# Patient Record
Sex: FEMALE | Race: WHITE | NOT HISPANIC OR LATINO | URBAN - METROPOLITAN AREA
[De-identification: names, ages, dates, MRNs, and addresses within clinical notes are randomized per-mention and may not be internally consistent; named-entity substitution may affect disease eponyms.]

---

## 2023-06-06 ENCOUNTER — OFFICE VISIT (OUTPATIENT)
Dept: PRIMARY CARE | Facility: CLINIC | Age: 53
End: 2023-06-06
Payer: COMMERCIAL

## 2023-06-06 VITALS
DIASTOLIC BLOOD PRESSURE: 86 MMHG | HEART RATE: 73 BPM | BODY MASS INDEX: 23.96 KG/M2 | WEIGHT: 143.8 LBS | HEIGHT: 65 IN | OXYGEN SATURATION: 98 % | SYSTOLIC BLOOD PRESSURE: 144 MMHG

## 2023-06-06 DIAGNOSIS — I21.4 NSTEMI (NON-ST ELEVATED MYOCARDIAL INFARCTION) (MULTI): ICD-10-CM

## 2023-06-06 DIAGNOSIS — I10 PRIMARY HYPERTENSION: Primary | ICD-10-CM

## 2023-06-06 PROBLEM — Z71.89 CARDIAC RISK COUNSELING: Status: ACTIVE | Noted: 2023-06-06

## 2023-06-06 PROBLEM — Z71.89 CARDIAC RISK COUNSELING: Status: RESOLVED | Noted: 2023-06-06 | Resolved: 2023-06-06

## 2023-06-06 PROCEDURE — 1036F TOBACCO NON-USER: CPT | Performed by: FAMILY MEDICINE

## 2023-06-06 PROCEDURE — 3077F SYST BP >= 140 MM HG: CPT | Performed by: FAMILY MEDICINE

## 2023-06-06 PROCEDURE — 99204 OFFICE O/P NEW MOD 45 MIN: CPT | Performed by: FAMILY MEDICINE

## 2023-06-06 PROCEDURE — 3079F DIAST BP 80-89 MM HG: CPT | Performed by: FAMILY MEDICINE

## 2023-06-06 RX ORDER — CARVEDILOL 6.25 MG/1
6.25 TABLET ORAL
COMMUNITY
Start: 2023-06-01 | End: 2023-10-24 | Stop reason: ALTCHOICE

## 2023-06-06 RX ORDER — NAPROXEN SODIUM 220 MG/1
81 TABLET, FILM COATED ORAL DAILY
COMMUNITY
Start: 2023-05-24

## 2023-06-06 RX ORDER — ATORVASTATIN CALCIUM 40 MG/1
1 TABLET, FILM COATED ORAL NIGHTLY
COMMUNITY
Start: 2023-06-05

## 2023-06-06 RX ORDER — LISINOPRIL 2.5 MG/1
2.5 TABLET ORAL DAILY
Qty: 30 TABLET | Refills: 5 | Status: SHIPPED | OUTPATIENT
Start: 2023-06-06 | End: 2023-10-24 | Stop reason: ALTCHOICE

## 2023-06-06 RX ORDER — CLOPIDOGREL BISULFATE 75 MG/1
75 TABLET ORAL
COMMUNITY
Start: 2023-06-05 | End: 2023-10-24 | Stop reason: ALTCHOICE

## 2023-06-06 RX ORDER — LORAZEPAM 0.5 MG/1
0.5 TABLET ORAL 2 TIMES DAILY
COMMUNITY
End: 2024-05-23 | Stop reason: WASHOUT

## 2023-06-06 ASSESSMENT — ENCOUNTER SYMPTOMS
ACTIVITY CHANGE: 0
HEADACHES: 0
PALPITATIONS: 0
APPETITE CHANGE: 0
BACK PAIN: 0
FACIAL ASYMMETRY: 0
COLOR CHANGE: 0
DIZZINESS: 0
LIGHT-HEADEDNESS: 0
FATIGUE: 0
COUGH: 0
ARTHRALGIAS: 0
CHEST TIGHTNESS: 0
CHOKING: 0
FEVER: 0

## 2023-06-06 NOTE — PROGRESS NOTES
"Subjective   Patient ID: Jerri Duque is a 52 y.o. female who presents for New patient to [facility]. .    HPI   Patient presents to [facility]. She did just have a heart attack. She is concerned about her blood pressure.     Fam Hx  Maternal DMII  Mom () living,   Dad () living,   Paternal CAD  Brother (40) living,     Exercise walks  ETOH denies  Caffeine denies  Tobacco denies    OB/GYN Dr. Chitra Little  Cardiologist, Dr. Rivera (NP)    Mammogram due 2023  DEXA @ 67  Colonoscopy ?    Past Med Hx  MI (2023)  HTN  HPL    Past Surg Hx  C section  Bunionectomy    Patient denies other complaints.  Review of Systems   Constitutional:  Negative for activity change, appetite change, fatigue and fever.   HENT:  Negative for congestion.    Respiratory:  Negative for cough, choking and chest tightness.    Cardiovascular:  Negative for chest pain, palpitations and leg swelling.   Musculoskeletal:  Negative for arthralgias, back pain and gait problem.   Skin:  Negative for color change and pallor.   Neurological:  Negative for dizziness, facial asymmetry, light-headedness and headaches.       Objective   /86 (BP Location: Left arm)   Pulse 73   Ht 1.645 m (5' 4.75\")   Wt 65.2 kg (143 lb 12.8 oz)   SpO2 98%   BMI 24.11 kg/m²   BSA Body surface area is 1.73 meters squared.      Physical Exam  Constitutional:       General: She is not in acute distress.     Appearance: Normal appearance. She is not toxic-appearing.   HENT:      Head: Normocephalic.      Right Ear: Tympanic membrane, ear canal and external ear normal.      Left Ear: Tympanic membrane, ear canal and external ear normal.      Nose: Nose normal.      Mouth/Throat:      Pharynx: Oropharynx is clear.   Eyes:      Conjunctiva/sclera: Conjunctivae normal.      Pupils: Pupils are equal, round, and reactive to light.   Cardiovascular:      Rate and Rhythm: Normal rate and regular rhythm.      Pulses: Normal pulses.      Heart sounds: Normal heart sounds. "   Pulmonary:      Effort: No respiratory distress.      Breath sounds: No wheezing, rhonchi or rales.   Abdominal:      General: Bowel sounds are normal. There is no distension.      Palpations: Abdomen is soft.      Tenderness: There is no abdominal tenderness.   Musculoskeletal:         General: No swelling or tenderness.      Cervical back: No tenderness.   Skin:     Findings: No lesion or rash.   Neurological:      General: No focal deficit present.      Mental Status: She is alert and oriented to person, place, and time. Mental status is at baseline.      Gait: Gait normal.   Psychiatric:         Mood and Affect: Mood normal.         Behavior: Behavior normal.         Thought Content: Thought content normal.         Judgment: Judgment normal.       No results found for any previous visit.     Current Outpatient Medications on File Prior to Visit   Medication Sig Dispense Refill    aspirin 81 mg chewable tablet Chew 1 tablet (81 mg) once daily.      atorvastatin (Lipitor) 40 mg tablet Take 1 tablet (40 mg) by mouth once daily at bedtime.      carvedilol (Coreg) 6.25 mg tablet Take 1 tablet (6.25 mg) by mouth 2 times a day with meals.      clopidogrel (Plavix) 75 mg tablet Take 1 tablet (75 mg) by mouth once daily.      LORazepam (Ativan) 0.5 mg tablet Take 1 tablet (0.5 mg) by mouth 2 times a day.       No current facility-administered medications on file prior to visit.     No images are attached to the encounter.           Assessment/Plan   Diagnoses and all orders for this visit:  Primary hypertension  NSTEMI (non-ST elevated myocardial infarction) (CMS/[facility])    Patient to continue on her current medication, she will consider lisinopril daily  Patient to see cardiology at   Patient to call if questions or concerns

## 2023-06-08 ENCOUNTER — TELEPHONE (OUTPATIENT)
Dept: PRIMARY CARE | Facility: CLINIC | Age: 53
End: 2023-06-08
Payer: COMMERCIAL

## 2023-06-08 NOTE — TELEPHONE ENCOUNTER
Pt left a message saying that she had a heart attack 2.5 wks ago. She was in to see you on 6-6-23 & you put her on Lisinopril 2.5 mg. She has had a headache last night & again this am. She didn't know if she has to continue taking it or if it could be causing her headache?   I called pt to get some BP readings - BP on Mon was 130/84, Tues 130/83, 144/86, 121/80 - so she said that it is coming down. But she didn't have a headache before she started the Lisinopril. She has not taken it today because she has been working & hasn't had a chance to. She also has a call out to her cardiologist regarding this, but she said you prescribed it so she wants to know what you think?

## 2023-10-04 ENCOUNTER — TELEPHONE (OUTPATIENT)
Dept: CARDIOLOGY | Facility: HOSPITAL | Age: 53
End: 2023-10-04
Payer: COMMERCIAL

## 2023-10-04 NOTE — TELEPHONE ENCOUNTER
Received T[facility] patient stating that her PCP wanted her to double her atorvastatin to 80mg daily based on lipid panel with LDL of 84 and history of MI. Patient questioning whether Dr. Watkins would agree. Lab results reviewed by Dr. Watkins who recommends that patient remain on atorvastatin 40mg once daily with LDL goal of less than 100. Patient aware of same and in agreement with treatment plan.

## 2023-10-22 PROBLEM — R92.30 DENSE BREAST TISSUE ON MAMMOGRAM: Status: ACTIVE | Noted: 2020-09-30

## 2023-10-22 PROBLEM — E55.9 VITAMIN D DEFICIENCY: Status: ACTIVE | Noted: 2017-03-01

## 2023-10-22 PROBLEM — D51.9 ANEMIA DUE TO VITAMIN B12 DEFICIENCY: Status: ACTIVE | Noted: 2017-03-07

## 2023-10-22 PROBLEM — I25.42 SPONTANEOUS DISSECTION OF CORONARY ARTERY: Status: ACTIVE | Noted: 2023-10-22

## 2023-10-22 PROBLEM — N90.89 VULVAR LESION: Status: ACTIVE | Noted: 2021-02-10

## 2023-10-22 PROBLEM — E78.5 HYPERLIPIDEMIA: Status: ACTIVE | Noted: 2023-05-24

## 2023-10-22 PROBLEM — I21.4 NSTEMI (NON-ST ELEVATED MYOCARDIAL INFARCTION) (MULTI): Status: ACTIVE | Noted: 2023-05-22

## 2023-10-22 PROBLEM — I10 ESSENTIAL HYPERTENSION: Status: ACTIVE | Noted: 2017-03-01

## 2023-10-22 RX ORDER — CARVEDILOL 3.12 MG/1
3.12 TABLET ORAL 2 TIMES DAILY
COMMUNITY
Start: 2023-08-21 | End: 2023-10-24 | Stop reason: ALTCHOICE

## 2023-10-22 RX ORDER — AMLODIPINE BESYLATE 2.5 MG/1
2.5 TABLET ORAL DAILY
COMMUNITY
Start: 2023-06-08 | End: 2023-10-24 | Stop reason: ALTCHOICE

## 2023-10-22 RX ORDER — ATORVASTATIN CALCIUM 80 MG/1
80 TABLET, FILM COATED ORAL
COMMUNITY
Start: 2023-10-03 | End: 2023-10-24 | Stop reason: ALTCHOICE

## 2023-10-24 ENCOUNTER — OFFICE VISIT (OUTPATIENT)
Dept: CARDIOLOGY | Facility: HOSPITAL | Age: 53
End: 2023-10-24
Payer: COMMERCIAL

## 2023-10-24 ENCOUNTER — HOSPITAL ENCOUNTER (OUTPATIENT)
Dept: CARDIOLOGY | Facility: HOSPITAL | Age: 53
Discharge: HOME | End: 2023-10-24
Payer: COMMERCIAL

## 2023-10-24 VITALS
SYSTOLIC BLOOD PRESSURE: 128 MMHG | DIASTOLIC BLOOD PRESSURE: 72 MMHG | BODY MASS INDEX: 24.03 KG/M2 | HEART RATE: 55 BPM | HEIGHT: 66 IN | OXYGEN SATURATION: 100 % | WEIGHT: 149.5 LBS

## 2023-10-24 DIAGNOSIS — I25.42 SPONTANEOUS DISSECTION OF CORONARY ARTERY: ICD-10-CM

## 2023-10-24 DIAGNOSIS — I25.2 OLD MYOCARDIAL INFARCTION: ICD-10-CM

## 2023-10-24 DIAGNOSIS — I25.42 CORONARY ARTERY DISSECTION: ICD-10-CM

## 2023-10-24 DIAGNOSIS — Z01.818 ENCOUNTER FOR OTHER PREPROCEDURAL EXAMINATION: ICD-10-CM

## 2023-10-24 LAB — EJECTION FRACTION APICAL 4 CHAMBER: 51.9

## 2023-10-24 PROCEDURE — 3078F DIAST BP <80 MM HG: CPT | Performed by: INTERNAL MEDICINE

## 2023-10-24 PROCEDURE — 93306 TTE W/DOPPLER COMPLETE: CPT

## 2023-10-24 PROCEDURE — 3074F SYST BP LT 130 MM HG: CPT | Performed by: INTERNAL MEDICINE

## 2023-10-24 PROCEDURE — 93306 TTE W/DOPPLER COMPLETE: CPT | Performed by: INTERNAL MEDICINE

## 2023-10-24 PROCEDURE — 99214 OFFICE O/P EST MOD 30 MIN: CPT | Performed by: INTERNAL MEDICINE

## 2023-10-24 PROCEDURE — 1036F TOBACCO NON-USER: CPT | Performed by: INTERNAL MEDICINE

## 2023-10-24 RX ORDER — METOPROLOL SUCCINATE 25 MG/1
25 TABLET, EXTENDED RELEASE ORAL DAILY
Qty: 90 TABLET | Refills: 3 | Status: SHIPPED | OUTPATIENT
Start: 2023-10-24 | End: 2024-01-10 | Stop reason: SDUPTHER

## 2023-10-24 NOTE — PROGRESS NOTES
"10/24/23    Vascular Medicine - FMD/SCAD Program    History Of Present Illness:    Jerri Duque is a 53 y.o. 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  She had previously un Rx HTN and was under a lot of chronic emotional stress. She has no definite underlying FMD on brain to pelvis imaging.    When I saw her in July, we d'c lisinopril to due to cough and some low BP reads.    Since I saw her in July, she is doing well. No interval cardiac events. She has completed cardiac rehab. She continue to have some non exertional \"tightness\" in her chest ~ 1 x a month; better with Tylenol or changing body position or relaxing if stressed. She feels better when she walks.    No bleeding on Asprin. Bps 110s-120s/60s-70s on Coreg 6.25 mg BID.     PMH: Hypertension, no well Rx in the past  PSH: , right foot bunion and wisdom tooth extraction.  FH: Father with CABG x4 at age 70s, mother with well-controlled diabetes and hypertension no dissection, aneurysm or connective tissue disease. Her 40 years old brother is healthy.   SH: She is  and has 16-year-old son, non-smoker denies alcohol or illicit drugs. 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      Past Medical History:  She has a past medical history of Heart attack (CMS/HCC), Hypercholesterolemia, and Hypertension.    Past Surgical History:  She has a past surgical history that includes  section, low transverse; [place] tooth extraction; and Bunionectomy.      Social History:  She reports that she has never smoked. She has been exposed to tobacco smoke. She has never used smokeless tobacco. She reports that she does not currently use alcohol. She reports that she does not use drugs.    Family History:  Family History   Problem Relation Name Age of Onset    Diabetes type II Mother      Other (cardiac disorder) Father      No Known Problems Brother          Allergies:  Patient has no known allergies.    Outpatient " "Medications:  Current Outpatient Medications   Medication Instructions    aspirin 81 mg, oral, Daily    atorvastatin (Lipitor) 40 mg tablet 1 tablet, oral, Nightly    LORazepam (ATIVAN) 0.5 mg, oral, 2 times daily     ROS + hot flashes, no period since May, weight gain    Physical Exam:  Last Recorded Vitals:  Vitals:    10/24/23 1558 10/24/23 1559   BP: 120/65 128/72   BP Location: Left arm Right arm   Patient Position: Sitting Sitting   Pulse: 55    SpO2: 100%    Weight: 67.8 kg (149 lb 8 oz)    Height: 1.676 m (5' 6\")    HEENT benign  No carotid bruits  +S1, S2, RRR; no m/r/g  Clear lungs  No edema  She is alert, oriented, fluid speech  Appropriate affect    Recent lipids on atorvastatin 40 mg/day  T chol 157  LDL 84 mg/dL (was 155 mg/dL in 9.2022)  HDL 58 mg/dL      Brain to pelvis CTA 6. 2023  No aortic or visceral branch aneurysms  No carotid/vert dissections/FMD  No IC aneurysms  I don't see FMD in the cerebrovascular, iliac visceral, or any arteries.   Slight tortuosity right vert V3 segment, ? slight ectasia -- repeat scan 3 years.    ECG today: Sinus bradycardia at 55 beats per minute. Normal intervals/axis. No ischemic changes.    Today's Transthoracic Echo (TTE)   CONCLUSIONS:   1. Left ventricular systolic function is normal with a 60-65% estimated ejection fraction.   2. RVSP within normal limits.    Assessment/Plan     53 y.o. woman with OM SCAD, NSTEMI in 5.2023 in setting of uncontrolled HTN. She has recovered well. She has completed cardiac rehab. She has atypical chest pain.  Her LVEF has normalized on today's Echo.    At this time would continue aspirin. I think OK to switch to metoprolol XL 25 mg every day for ease of daily dosing; she will monitor HR/BP and if Bps > 130/80 mm Hg at any time can escalate metoprolol XL.    I agree wit Dr. Watkins and others LDL goal < 100 mg/dL is adequate for this non atherosclerotic MI.    She has minor arterial tortuosity, no underlying FMD and no occult " aneurysms/dissections; will plan repeat brain to pelvis imaging at 3 year alisha from prior scans. Overall, I think yield of conventional genetic testing for arteriopathy/aortopathy genes is low for her SCAD event and would not refer.    Discussed her mild hot flashes, likely perimenopause off OCPs.  Would attempt to exhaust all non hormonal therapies before considering transdermal estrogen/progesterone -- would favor non hormonal therapies. She is doing to work on more exercise for starters for weight gain.    RTC spring, 2024 (1 year alisha of SCAD) or sooner PRN.    Merced Perez MD  Co-Director, Vascular Center  [facility] [place], [facility]   [facility] Master Clinician in Fibromuscular Dysplasia and Vascular Care  Professor of Medicine  [facility]

## 2023-10-24 NOTE — LETTER
"2023     Amee Choi DO  [address]  [facility]  [place] [address]    Patient: Jerri Duque   YOB: 1970   Date of Visit: 10/24/2023       Dear Dr. Amee Choi DO:    Thank you for referring Jerri Duque to me for evaluation. Below are my notes for this consultation.  If you have questions, please do not hesitate to call me. I look forward to following your patient along with you.       Sincerely,     Merced Peerz MD      CC: No Recipients  ______________________________________________________________________________________    10/24/23    Vascular Medicine - FMD/SCAD Program    History Of Present Illness:    Jerri Duque is a 53 y.o. 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  She had previously un Rx HTN and was under a lot of chronic emotional stress. She has no definite underlying FMD on brain to pelvis imaging.    When I saw her in July, we d'c lisinopril to due to cough and some low BP reads.    Since I saw her in July, she is doing well. No interval cardiac events. She has completed cardiac rehab. She continue to have some non exertional \"tightness\" in her chest ~ 1 x a month; better with Tylenol or changing body position or relaxing if stressed. She feels better when she walks.    No bleeding on Asprin. Bps 110s-120s/60s-70s on Coreg 6.25 mg BID.     PMH: Hypertension, no well Rx in the past  PSH: , right foot bunion and wisdom tooth extraction.  FH: Father with CABG x4 at age 70s, mother with well-controlled diabetes and hypertension no dissection, aneurysm or connective tissue disease. Her 40 years old brother is healthy.   SH: She is  and has 16-year-old son, non-smoker denies alcohol or illicit drugs. 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      Past Medical History:  She has a past medical history of Heart attack (CMS/HCC), Hypercholesterolemia, and " "Hypertension.    Past Surgical History:  She has a past surgical history that includes  section, low transverse; [place] tooth extraction; and Bunionectomy.      Social History:  She reports that she has never smoked. She has been exposed to tobacco smoke. She has never used smokeless tobacco. She reports that she does not currently use alcohol. She reports that she does not use drugs.    Family History:  Family History   Problem Relation Name Age of Onset   • Diabetes type II Mother     • Other (cardiac disorder) Father     • No Known Problems Brother          Allergies:  Patient has no known allergies.    Outpatient Medications:  Current Outpatient Medications   Medication Instructions   • aspirin 81 mg, oral, Daily   • atorvastatin (Lipitor) 40 mg tablet 1 tablet, oral, Nightly   • LORazepam (ATIVAN) 0.5 mg, oral, 2 times daily     ROS + hot flashes, no period since May, weight gain    Physical Exam:  Last Recorded Vitals:  Vitals:    10/24/23 1558 10/24/23 1559   BP: 120/65 128/72   BP Location: Left arm Right arm   Patient Position: Sitting Sitting   Pulse: 55    SpO2: 100%    Weight: 67.8 kg (149 lb 8 oz)    Height: 1.676 m (5' 6\")    HEENT benign  No carotid bruits  +S1, S2, RRR; no m/r/g  Clear lungs  No edema  She is alert, oriented, fluid speech  Appropriate affect    Recent lipids on atorvastatin 40 mg/day  T chol 157  LDL 84 mg/dL (was 155 mg/dL in )  HDL 58 mg/dL      Brain to pelvis CTA 2023  No aortic or visceral branch aneurysms  No carotid/vert dissections/FMD  No IC aneurysms  I don't see FMD in the cerebrovascular, iliac visceral, or any arteries.   Slight tortuosity right vert V3 segment, ? slight ectasia -- repeat scan 3 years.    ECG today: Sinus bradycardia at 55 beats per minute. Normal intervals/axis. No ischemic changes.    Today's Transthoracic Echo (TTE)   CONCLUSIONS:   1. Left ventricular systolic function is normal with a 60-65% estimated ejection fraction.   2. RVSP " within normal limits.    Assessment/Plan    53 y.o. woman with OM SCAD, NSTEMI in 5.2023 in setting of uncontrolled HTN. She has recovered well. She has completed cardiac rehab. She has atypical chest pain.  Her LVEF has normalized on today's Echo.    At this time would continue aspirin. I think OK to switch to metoprolol XL 25 mg every day for ease of daily dosing; she will monitor HR/BP and if Bps > 130/80 mm Hg at any time can escalate metoprolol XL.    I agree wit Dr. Wtakins and others LDL goal < 100 mg/dL is adequate for this non atherosclerotic MI.    She has minor arterial tortuosity, no underlying FMD and no occult aneurysms/dissections; will plan repeat brain to pelvis imaging at 3 year alisha from prior scans. Overall, I think yield of conventional genetic testing for arteriopathy/aortopathy genes is low for her SCAD event and would not refer.    Discussed her mild hot flashes, likely perimenopause off OCPs.  Would attempt to exhaust all non hormonal therapies before considering transdermal estrogen/progesterone -- would favor non hormonal therapies. She is doing to work on more exercise for starters for weight gain.    RTC spring, 2024 (1 year alisha of SCAD) or sooner PRN.    Merced Perez MD  Co-Director, Vascular Center  [facility] [place], [facility]   [facility] Family Master Clinician in Fibromuscular Dysplasia and Vascular Care  Professor of Medicine  [facility]

## 2023-10-24 NOTE — PATIENT INSTRUCTIONS
Stop Carvedilol.  Start Metoprolol 25mg once daily.  Continue other medications as prescribed.    See you back in May virtually.    Very truly yours,    Merced Perez MD  Co-Director, Vascular Center  [facility] [place], [facility]   [facility] Family Master Clinician in Fibromuscular Dysplasia and Vascular Care  Professor of Medicine  [facility]

## 2024-01-09 ENCOUNTER — TELEPHONE (OUTPATIENT)
Dept: CARDIOLOGY | Facility: CLINIC | Age: 54
End: 2024-01-09
Payer: COMMERCIAL

## 2024-01-10 DIAGNOSIS — I25.42 SPONTANEOUS DISSECTION OF CORONARY ARTERY: ICD-10-CM

## 2024-01-11 RX ORDER — METOPROLOL SUCCINATE 25 MG/1
25 TABLET, EXTENDED RELEASE ORAL DAILY
Qty: 90 TABLET | Refills: 3 | Status: SHIPPED | OUTPATIENT
Start: 2024-01-11 | End: 2025-01-10

## 2024-01-11 NOTE — TELEPHONE ENCOUNTER
Patient called to discuss change from carvedilol to metoprolol ordered by Dr. Perez. Notes reviewed and all questions answered. Also concerned about leg stiffness and wants to know if she can stop the atorvastatin. Reviewed with Dr. Watkins. OK to hold for 2-3 weeks and if stiffness doesn't improve, she can resume the atorvastatin. Agreed to call patient Monday for update and to reschedule March appointment as Dr. Watkins will be out of the office that day.

## 2024-01-16 NOTE — TELEPHONE ENCOUNTER
TCT patient who states that since stopping atorvastatin, she has not noticed any improvement in LE 'heaviness'. Advised she continue to hold for another 2 weeks and if still no improvement, she should resume atorvastatin and discuss with PCP. States understanding and agreement with treatment plan. FUV rescheduled

## 2024-01-31 ENCOUNTER — TELEPHONE (OUTPATIENT)
Dept: CARDIOLOGY | Facility: CLINIC | Age: 54
End: 2024-01-31
Payer: COMMERCIAL

## 2024-02-01 NOTE — TELEPHONE ENCOUNTER
TCT patient who states that leg 'heaviness' in the morning did not improve after stopping statin for 1 month. Advised that she resume the statin. Reviewed other vague symptoms of hand swelling and weight gain. States she's been eating 'whatever I want' and has gained 10 lbs. Advised she closely monitor her sodium intake and try to eat a low salt diet. Also discussed trying coQ10 for joint pain/myalgias. Agrees with trying the above, will start documenting her BP and bring log to fuv next month. Also concerned that these symptoms may have started once carvedilol was changed to metoprolol in October but does not want to change back at this time. Also thinks menopause may be to blame for the above. Encouraged her to discuss non cardiac symptoms with PCP and will review BP/BB at next visit.

## 2024-03-14 NOTE — PROGRESS NOTES
Primary Care Physician: Amee Choi DO  Date of Visit: 03/18/2024  2:20 PM EDT  Location of visit: [address]     Chief Complaint:   Follow up visit     HPI / Summary:   53-year-old female with HTN, dyslipidemia, possible asthma, NSTEMI 5/2023 with cath suggestive of SCAD of small OM vessel.     She presented to [facility]/22/2023 with nonexertional chest pain/pressure that radiated to the left shoulder that day. She was at work sitting when it occurred. Works at [employer]. Presented to [facility] and had increased troponin c/w NSTEMI. Echocardiogram showed EF 53% with basal inferior wall motion abnormality. She was sent for left heart catheterization which showed severe narrowing of a small OM 2 that was not amenable to PCI otherwise trivial CAD. She was started on carvedilol, atorvastatin and given DAPT with plan for 6 months. She reported taking oral contraceptives and this was recommended to be discontinued after discussion with her GYN.     interval events:  6 month follow up  Some b/l hand and feet cramping. No improvement after holding atorvastatin x 5 weeks. Worse in am and gets better as walks around.     Saw Dr. Perez. CTA without evidence of FMD. Some vertebral artery tortuosity with plans to repeat CT in 2-3 yrs. She was switched to Toprol XL from carvedilol.         [facility] 5/23/2023 ([facility]): Left main normal, LCx normal with severe narrowing of sidebranch OM 2 that is small, LAD normal with myocardial bridge 20%, RCA dominant and normal     ECHO 10/2023: LVEF 60-65%, RVSP normal  ECHO 5/23: LVEF 53%, grade 1 diastolic dysfunction, normal GLS 15.8%     Lipids 5/23: ,      Family history of father with MI  Non-smoker, nondrinker, no illicits         Past Medical History:  Past Medical History:   Diagnosis Date    Coronary artery dissection     Heart attack (CMS/HCC)     Hypercholesterolemia     Hypertension         Past Surgical History:  Past Surgical History:  "  Procedure Laterality Date    BUNIONECTOMY       SECTION, LOW TRANSVERSE      WISDOM TOOTH EXTRACTION            Social History:  She reports that she has never smoked. She has been exposed to tobacco smoke. She has never used smokeless tobacco. She reports that she does not currently use alcohol. She reports that she does not use drugs.    Family History:  family history includes Diabetes type II in her mother; No Known Problems in her brother; cardiac disorder in her father.      Allergies:  No Known Allergies    Outpatient Medications:  Current Outpatient Medications   Medication Instructions    aspirin 81 mg, oral, Daily    atorvastatin (Lipitor) 40 mg tablet 1 tablet, oral, Nightly    LORazepam (ATIVAN) 0.5 mg, oral, 2 times daily    metoprolol succinate XL (TOPROL-XL) 25 mg, oral, Daily, Do not crush or chew.       Physical Exam:  GENERAL: alert, cooperative, pleasant, in no acute distress  SKIN: warm, dry, no rash.  NECK: no JVD, no FREIDA  CARDIAC: Regular rate and rhythm with no rubs, murmurs, or gallops  CHEST: Normal respiratory efforts, lungs clear to auscultation bilaterally.  ABDOMEN: soft, nontender, nondistended  EXTREMITIES: no edema  NEURO: Alert and oriented x 3.  Grossly normal.  Moves all 4 extremities.    Vitals:    24 1358   BP: 132/73   BP Location: Left arm   Pulse: 58   SpO2: 98%   Weight: 70.3 kg (155 lb)     Wt Readings from Last 5 Encounters:   10/24/23 67.8 kg (149 lb 8 oz)   23 66.6 kg (146 lb 12.8 oz)   23 64.9 kg (143 lb)   23 64.9 kg (143 lb)   23 65.2 kg (143 lb 12.8 oz)     Body mass index is 25.02 kg/m².        Last Labs:  CMP:No results for input(s): \"NA\", \"K\", \"CL\", \"CO2\", \"ANIONGAP\", \"BUN\", \"CREATININE\", \"EGFR\", \"GLUCOSE\" in the last 81439 hours.No results for input(s): \"ALBUMIN\", \"ALKPHOS\", \"ALT\", \"AST\", \"BILITOT\", \"LIPASE\" in the last 12836 hours.    No lab exists for component: \"CA\"  CBC:No results for input(s): \"WBC\", \"HGB\", \"HCT\", " "\"PLT\", \"MCV\" in the last 17855 hours.  COAG: No results for input(s): \"INR\", \"DDIMERVTE\" in the last 01383 hours.  ENDO:  Recent Labs     05/23/23  0630   HGBA1C 5.2      CARDIAC: No results for input(s): \"LDH\", \"CKMB\", \"TROPHS\", \"BNP\" in the last 15945 hours.    No lab exists for component: \"CK\", \"CKMBP\"No results for input(s): \"CHOL\", \"LDLF\", \"LDL\", \"LDLCALC\", \"HDL\", \"TRIG\" in the last 21586 hours.  No data recorded            Assessment/Plan   53-year-old female with history of HTN, dyslipidemia, non-STEMI 5/2023 secondary to type II SCAD of OM      We will continue to focus on secondary prevention with good BP control, metoprolol, statin and aspirin. She has already started healthier eating habits.  Regular moderate exercise discussed.  No heavy lifting.  Recheck lipids for reasonable goal LDL <100.  Her bilateral hand and foot cramping is consistent with arthritis and can follow-up with PCP as needed.  Very low suspicion for statin related association or PAD.  Plan to follow-up in about 6 to 9 months             Followup Appts:  Future Appointments   Date Time Provider Department Center   5/20/2024  9:30 AM Merced Perez MD [id number] [facility]   9/23/2024  1:00 PM Tyler Watkins DO [id number] [facility]           ____________________________________________________________  Tyler Watkins DO  [facility]  [facility]  "

## 2024-03-18 ENCOUNTER — OFFICE VISIT (OUTPATIENT)
Dept: CARDIOLOGY | Facility: CLINIC | Age: 54
End: 2024-03-18
Payer: COMMERCIAL

## 2024-03-18 ENCOUNTER — APPOINTMENT (OUTPATIENT)
Dept: CARDIOLOGY | Facility: CLINIC | Age: 54
End: 2024-03-18
Payer: COMMERCIAL

## 2024-03-18 VITALS
OXYGEN SATURATION: 98 % | DIASTOLIC BLOOD PRESSURE: 73 MMHG | WEIGHT: 155 LBS | BODY MASS INDEX: 25.02 KG/M2 | HEART RATE: 58 BPM | SYSTOLIC BLOOD PRESSURE: 132 MMHG

## 2024-03-18 DIAGNOSIS — E78.49 OTHER HYPERLIPIDEMIA: ICD-10-CM

## 2024-03-18 DIAGNOSIS — I25.42 SPONTANEOUS DISSECTION OF CORONARY ARTERY: Primary | ICD-10-CM

## 2024-03-18 PROCEDURE — 3078F DIAST BP <80 MM HG: CPT | Performed by: INTERNAL MEDICINE

## 2024-03-18 PROCEDURE — 3075F SYST BP GE 130 - 139MM HG: CPT | Performed by: INTERNAL MEDICINE

## 2024-03-18 PROCEDURE — 99214 OFFICE O/P EST MOD 30 MIN: CPT | Performed by: INTERNAL MEDICINE

## 2024-03-18 PROCEDURE — 1036F TOBACCO NON-USER: CPT | Performed by: INTERNAL MEDICINE

## 2024-03-18 ASSESSMENT — PATIENT HEALTH QUESTIONNAIRE - PHQ9
1. LITTLE INTEREST OR PLEASURE IN DOING THINGS: NOT AT ALL
2. FEELING DOWN, DEPRESSED OR HOPELESS: NOT AT ALL
SUM OF ALL RESPONSES TO PHQ9 QUESTIONS 1 AND 2: 0

## 2024-03-18 ASSESSMENT — COLUMBIA-SUICIDE SEVERITY RATING SCALE - C-SSRS
6. HAVE YOU EVER DONE ANYTHING, STARTED TO DO ANYTHING, OR PREPARED TO DO ANYTHING TO END YOUR LIFE?: NO
1. IN THE PAST MONTH, HAVE YOU WISHED YOU WERE DEAD OR WISHED YOU COULD GO TO SLEEP AND NOT WAKE UP?: NO
2. HAVE YOU ACTUALLY HAD ANY THOUGHTS OF KILLING YOURSELF?: NO

## 2024-03-25 ENCOUNTER — APPOINTMENT (OUTPATIENT)
Dept: CARDIOLOGY | Facility: CLINIC | Age: 54
End: 2024-03-25
Payer: COMMERCIAL

## 2024-04-22 ENCOUNTER — TELEPHONE (OUTPATIENT)
Dept: CARDIOLOGY | Facility: CLINIC | Age: 54
End: 2024-04-22
Payer: COMMERCIAL

## 2024-04-22 NOTE — TELEPHONE ENCOUNTER
Unable to find notes regarding poison ivy. TCT patient but mailbox is full so unable to leave VMM and no answer

## 2024-04-23 NOTE — TELEPHONE ENCOUNTER
TCT patient who states she was prescribed methylprednisone for poison Ivy. Has been taking for past few days. Explained that steroids are ok with current medications. Reviewed labs scanned into chart. Explained that I will make Dr. Watkins aware. All other questions answered.

## 2024-05-20 ENCOUNTER — APPOINTMENT (OUTPATIENT)
Dept: CARDIOLOGY | Facility: HOSPITAL | Age: 54
End: 2024-05-20
Payer: COMMERCIAL

## 2024-05-23 ENCOUNTER — TELEMEDICINE (OUTPATIENT)
Dept: CARDIOLOGY | Facility: HOSPITAL | Age: 54
End: 2024-05-23
Payer: COMMERCIAL

## 2024-05-23 DIAGNOSIS — E78.2 MIXED HYPERLIPIDEMIA: ICD-10-CM

## 2024-05-23 DIAGNOSIS — R63.5 WEIGHT GAIN: ICD-10-CM

## 2024-05-23 DIAGNOSIS — Z78.0 MENOPAUSE PRESENT: ICD-10-CM

## 2024-05-23 DIAGNOSIS — I25.42 SPONTANEOUS DISSECTION OF CORONARY ARTERY: ICD-10-CM

## 2024-05-23 DIAGNOSIS — I10 CHRONIC HYPERTENSION: Primary | ICD-10-CM

## 2024-05-23 DIAGNOSIS — I21.4 NSTEMI (NON-ST ELEVATED MYOCARDIAL INFARCTION) (MULTI): ICD-10-CM

## 2024-05-23 PROCEDURE — 99214 OFFICE O/P EST MOD 30 MIN: CPT | Performed by: INTERNAL MEDICINE

## 2024-05-23 PROCEDURE — 1036F TOBACCO NON-USER: CPT | Performed by: INTERNAL MEDICINE

## 2024-05-23 NOTE — PROGRESS NOTES
"24    Vascular Medicine - FMD/Arterial Dissection Program    Virtual or Telephone Consent    An interactive audio and video telecommunication system which permits real time communications between the patient (at the originating site) and provider (at the distant site) was utilized to provide this telehealth service.   Verbal consent was requested and obtained from Jerri Duque on this date, 24 for a telehealth visit.       History Of Present Illness:    Jerri Duque is a 53 y.o. 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  She had previously un Rx HTN and was under a lot of chronic emotional stress. She has no definite underlying FMD on brain to pelvis imaging, slight cerebrovascular ectasia/tortuosity.    Since I saw her in the past, she is dong well overall.  Has very, very infrequent discomfort in her chest, really a \"sensation\" that is 1/10 pressure, lasts a few minutes, and never comes on with walking.  No dyspnea on exertion or palpitations. No interval events/ER visits/hospitalizations.    She has gained weight, up to peak 160# from baseline ~145#; on steroids recently for poison ivy.    She is taking metoprolol XL 25 mg QD; Bps variable 103-118, highest 135 systolic. Diastolic Bps 78-85.      She has pain in her ankles/feet in the morning; also some discomfort/tingling in her upper extremities.    SH: She is  and has 17-year-old son, non-smoker denies alcohol or illicit drugs. 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      Past Medical History:  She has a past medical history of Coronary artery dissection, Heart attack (Multi), Hypercholesterolemia, and Hypertension.    Past Surgical History:  She has a past surgical history that includes  section, low transverse; [place] tooth extraction; and Bunionectomy.      Social History:  She reports that she has never smoked. She has been exposed to tobacco smoke. She has never used " smokeless tobacco. She reports that she does not currently use alcohol. She reports that she does not use drugs.    Family History:  Family History   Problem Relation Name Age of Onset    Diabetes type II Mother      Other (cardiac disorder) Father      No Known Problems Brother     FH: Father with CABG x4 at age 70s, mother with well-controlled diabetes and hypertension no dissection, aneurysm or connective tissue disease. Her 40 years old brother is healthy.      Allergies:  Patient has no known allergies.    Outpatient Medications:  Current Outpatient Medications   Medication Instructions    aspirin 81 mg, oral, Daily    atorvastatin (Lipitor) 40 mg tablet 1 tablet, oral, Nightly    metoprolol succinate XL (TOPROL-XL) 25 mg, oral, Daily, Do not crush or chew.       Physical Exam:  HEENT benign  Breathing non labored  She is alert, oriented, fluid speech  Appropriate affect    Recent lipids on atorvastatin 40 mg/day from 3.2024 a little worse  T chol 169 (was 157 mg/d)  LDL 98 (was 84 mg/dL at peak was 155 mg/dL in 9.2022)  HDL 55 (was 58 mg/dL )     ALT/AST wnl  Cr 0.76  K 4.7    HgB 13.6  PLT  =254K    10.2023 head to pelvis CTA 6. 2023  No aortic or visceral branch aneurysms  No carotid/vert dissections/FMD  No IC aneurysms  I don't see FMD in the cerebrovascular, iliac visceral, or any arteries.   Slight tortuosity right vert V3 segment, ? slight ectasia -- repeat scan 3 years.    10.2023  Transthoracic Echo (TTE)   CONCLUSIONS:   1. Left ventricular systolic function is normal with a 60-65% estimated ejection fraction. No RWMA reported   2. RVSP within normal limits.    Assessment/Plan     53 y.o. woman with OM SCAD, NSTEMI in 5.2023 in setting of uncontrolled HTN. She is now at the 1 year alisha post event and has recovered well. She has completed cardiac rehab. She has atypical chest pain, less frequent than at past visit.  Her LVEF is normal no evidence of heart failure.    She has gained ~ 15# post  "menopause (no period in 13 months) and coming off OCPS.   BP running slightly higher, diastolic BP 80s. She recently completed a few weeks of prednisone post poison ivy.    At this time would continue aspirin. Continue metoprolol XL 25 mg QD (was previously on carvedilol).  She will monitor HR/BP and if Bps > 130/80 mm Hg at any time can escalate metoprolol XL 50 mg every day. She will let me know how blood pressures run, especially the diastolic Bps I'd like to see, 70 mg/dL.    She has minor arterial tortuosity, vertebral ectasia.  no underlying FMD and no occult aneurysms/dissections; will plan repeat head to pelvis imaging at 3 year alisha from prior scans. Overall, I think yield of conventional genetic testing for arteriopathy/aortopathy genes is low for her SCAD event and would not refer.    We talked about weight loss strategies in menopause. Her BMI is now in the \"overweight\" range. I will refer to nutrition for help.    RTC 6 months with ECG or sooner PRN.    Merced Perez MD  Co-Director, Vascular Center  [facility] [place], [facility]   Madi Sharma Family Master Clinician in Fibromuscular Dysplasia and Vascular Care  Professor of Medicine  [facility]     "

## 2024-05-23 NOTE — PATIENT INSTRUCTIONS
Nice to see you today for our virtual visit.  Continue medications as prescribed.  Let us know how your BP's are doing with weight loss and exercise.     See you back in 6 months in person with EKG.

## 2024-06-19 DIAGNOSIS — E78.2 MIXED HYPERLIPIDEMIA: ICD-10-CM

## 2024-06-19 RX ORDER — ATORVASTATIN CALCIUM 40 MG/1
40 TABLET, FILM COATED ORAL NIGHTLY
Qty: 90 TABLET | Refills: 3 | Status: SHIPPED | OUTPATIENT
Start: 2024-06-19 | End: 2025-06-19

## 2024-07-19 ENCOUNTER — TELEMEDICINE CLINICAL SUPPORT (OUTPATIENT)
Dept: NUTRITION | Facility: CLINIC | Age: 54
End: 2024-07-19
Payer: COMMERCIAL

## 2024-07-19 VITALS — HEIGHT: 66 IN | WEIGHT: 154 LBS | BODY MASS INDEX: 24.75 KG/M2

## 2024-07-19 DIAGNOSIS — R63.5 WEIGHT GAIN: ICD-10-CM

## 2024-07-19 PROCEDURE — 97802 MEDICAL NUTRITION INDIV IN: CPT | Performed by: DIETITIAN, REGISTERED

## 2024-07-19 PROCEDURE — 97802 MEDICAL NUTRITION INDIV IN: CPT | Mod: 95 | Performed by: DIETITIAN, REGISTERED

## 2024-07-19 NOTE — PROGRESS NOTES
"Reason for Nutrition Visit:  Pt is a 53 y.o. female being seen remotely. Pt was referred by Merced Perez MD effective 5/23/34.  1. Weight gain  Referral to Nutrition Services         Past Medical Hx:  Patient Active Problem List   Diagnosis    Anemia due to vitamin B12 deficiency    Dense breast tissue on mammogram    Chronic hypertension    Hyperlipidemia    NSTEMI (non-ST elevated myocardial infarction) (Multi)    Spontaneous dissection of coronary artery    Vitamin D deficiency    Vulvar lesion        Current Outpatient Medications:     aspirin 81 mg chewable tablet, Chew 1 tablet (81 mg) once daily., Disp: , Rfl:     atorvastatin (Lipitor) 40 mg tablet, Take 1 tablet (40 mg) by mouth once daily at bedtime., Disp: 90 tablet, Rfl: 3    metoprolol succinate XL (Toprol-XL) 25 mg 24 hr tablet, Take 1 tablet (25 mg) by mouth once daily. Do not crush or chew., Disp: 90 tablet, Rfl: 3     Anthropometrics:  Anthropometrics  Height: 167.6 cm (5' 5.98\")  Weight: 69.9 kg (154 lb)  BMI (Calculated): 24.87   Weight change:    Significant Weight Change: No  - 145 lbs   July 2024 154 lbs     Lab Results   Component Value Date    HGBA1C 5.3 03/26/2024          Chemistry    No results found for: \"NA\", \"K\", \"CL\", \"CO2\", \"BUN\", \"CREATININE\", \"GLU\" No results found for: \"CALCIUM\", \"ALKPHOS\", \"AST\", \"ALT\", \"BILITOT\"       Food and Nutrition Hx:  Pt states she has been going through menopause. She not been concerned with her wt until the past few years. She does not smoke or drink. She does not eat red meat since her MI. She gained 10 lbs over the summer. She states she her clothes are tight.   Pt wakes up feel good. Her son is sick and she is not focused on herself. She has been cravings sugar.     Pt wakes up at 6:00 am.     24 Diet Recall:  Meal 1: Breakfast may be at 7:00 to include a toast with butter with a Greek yogurt or sometimes nothing.   Meal 2: Lunch is a turkey no bun with cheese or a salad with fruit, 1 " "ounce of cheese with mixed green with light dressing.   Energy drops in the afternoon and she can grab M&Ms.   Meal 3: Dinner is at 6:00 6 ounces of chicken, fruit salad, corn on the cob.   Snacks: sweets  Beverages: decaf coffee is consumed per day and 64 ounces of water     Allergies: None  Intolerance: None  Appetite: Good  Intake: >75%  GI Symptoms : bloating Frequency: frequent  Swallowing Difficulty: No problems with swallowing  Dentition : own    Types of Activities: Mostly Sedentary. Pt was walking 5 miles 5 days a week for about 60 minute at a moderate intensity. 300 minutes per week. She has not walked this week.     Sleep duration/quality : 5-6 hours and disrupted sleepPt can wake up for 3 hours. This week she has been sleeping though out the night.   Sleep disorders: none    Supplements: Denies   Cravings: Sweet  Energy Levels: Fluctuates    Food Preparation: Patient  Cooking Skills/Barriers: None reported  Grocery Shopping: Patient    Nutrition Focused Physical Exam:    Performed/Deferred: Deferred due to be being virtual visit    Estimated Energy Needs:  Energy Needs  Calculated Energy Needs Using Equations  Height: 167.6 cm (5' 5.98\")  Weight Used for Equation Calculations: 69.9 kg (154 lb)  Alejo Gould Equation (Overweight or Obese Patients): 1330  Equation Chosen to Use by RD: Oscar Lainez  Activity Factor: 1.5  Total Energy Needs: 1995  Estimated Energy Needs  Total Energy Estimated Needs (kCal): 1495 kCal     Nutrition Diagnosis:    Diagnosis Statement 1:  Diagnosis Status: New  Diagnosis : Food and nutrition related knowledge deficit related to  how to eat for slight wt loss as pt reports wt is up 10 lbs  as evidenced by  reports by pt of the need to learn.     Diagnosis Statement 2:  Diagnosis Status: New  Diagnosis : Self-monitoring deficit  related to  limited ability to identify and respond to hunger   as evidenced by  reports by pt of having cravings for sugar in the afternoon. "     Nutrition Interventions:  Medical nutrition therapy was given for wt loss.   Nutrition Counseling: Motivational Interviewing and CBT  Coordination of Care: None    Nutrition Recommendations:  1,200- 1,500 calories per day for 1 -1.5 lb wt loss per week. Heart healthy meal plan with a low saturated fat intake to <5 -6 % of energy (less than 9 grams of saturated fat per day), reduced intake of added sugars (<10% of total energy), 25 grams of fiber with intake of viscous fiber to 5 g/day to 10 g/day, plant sterols/stanols to 2 g/day, 1.1 gram of omega three fatty acids, and a 1,500 mg sodium restriction. Increase awareness of hunger and satiety while reducing cravings.     Nutrition Goals:  Via teach back method patient verbalized understanding of the following topics:  1) It is recommended to consume 1,200- 1,500 calories to lose 1-1.5 lbs wt loss per week. Spread the calorie throughout the day limiting calorie at one time no more then 500 calories at a time.   2) Aim for three meals and 1 -2 snacks per day.Strive to consume breakfast within 1 -2 hours of waking to jump start the metabolism. Aim for breakfast by 8:00 am, lunch at noon, snack 4:00 pm, dinner at 6:00 and may be a snack at 9:00 pm.  3)  Strive to include protein at the meals and even snacks. Protein at meals can increase the metabolism too.  Protein at snacks can sustain energy, stabilize blood glucose, and provide more contentment. Protein foods include egg whites, low fat cheese, nuts, nut butters, Greek yogurt, poultry, fish, tofu, plant-based protein powder, and/or plant-based protein drinks.   4) Patient was encouraged to identify at meal times hunger as a drop in energy and to focus at the end of meal, satiety, as a return in energy. Hunger may take place in the afternoon, strive to include a snack.   5) The recommendation for exercise and wt loss is 300- 400 minutes per week. Aim for 60 to 90 minutes of exercise 5 days a week. Incorporate 60  minutes of cardiovascular activity with 30 minutes of resistance training. Incorporate yoga one a day a week. An exercise routine incorporates a 5 minutes of warm up and then stretching, cardiovascular exercise/resistance, 5 -10- minutes of cool down and then stretching.     Educational Handouts/Practices: 1,200 calorie meal plan   Next session: Plate and Reset Breath     Merced Winslow MS, RDN, LD, MELISSA   Advanced Practice Clinical Dietitian  [email or web address]  Schedule line [phone]  Direct line [phone]     Readiness to Change : Good  Level of Understanding : Good  Anticipated Compliant : Good

## 2024-07-29 ENCOUNTER — TELEPHONE (OUTPATIENT)
Dept: CARDIOLOGY | Facility: CLINIC | Age: 54
End: 2024-07-29
Payer: COMMERCIAL

## 2024-07-29 NOTE — TELEPHONE ENCOUNTER
Patient called about atorvastatin 40mg tabs. Notes reviewed and patient had similar discussion with Dr. Colunga office last month. New prescription for atorvastatin 40mg tabs with refills for the year was sent at that time. TCT patient and left VMM to either call back if needed or message through My Chart

## 2024-09-18 NOTE — PROGRESS NOTES
Primary Care Physician: Amee Choi DO  Date of Visit: 09/23/2024  1:00 PM EDT  Location of visit: [address]     Chief Complaint:   Follow up visit     HPI / Summary:   54-year-old female with HTN, dyslipidemia, possible asthma, NSTEMI 5/2023 with cath suggestive of SCAD of small OM vessel.     She presented to [facility] 5/22/2023 with nonexertional chest pain/pressure that radiated to the left shoulder that day. She was at work sitting when it occurred. Works at [employer]. Presented to [facility] and had increased troponin c/w NSTEMI. Echocardiogram showed EF 53% with basal inferior wall motion abnormality. She was sent for left heart catheterization which showed severe narrowing of a small OM 2 that was not amenable to PCI otherwise trivial CAD.     Saw Dr. Perez. CTA without evidence of FMD. Some vertebral artery tortuosity with plans to repeat CT in 2-3 yrs. She was switched to Toprol XL from carvedilol.     interval events:  6 month follow up  Bothered by 15 lb weight gain in past year since being in menopause. Trying to eat less. Wants to get back to 140 lbs. Realizes with menopause this might not happen.  No chest pain or dyspnea  She biked 10 miles daily in the heat in [facility] this summer without issue.  Continues to have arthritic pain in feet in the morning that improved      LHC 5/23/2023 ([facility]): Left main normal, LCx normal with severe narrowing of sidebranch OM 2 that is small, LAD normal with myocardial bridge 20%, RCA dominant and normal     ECHO 10/2023: LVEF 60-65%, RVSP normal  ECHO 5/23: LVEF 53%, grade 1 diastolic dysfunction, normal GLS 15.8%        Family history of father with MI  Non-smoker, nondrinker, no illicits         Past Medical History:  Past Medical History:   Diagnosis Date    Coronary artery dissection     Heart attack (Multi)     Hypercholesterolemia     Hypertension         Past Surgical History:  Past Surgical History:   Procedure Laterality Date  "   BUNIONECTOMY       SECTION, LOW TRANSVERSE      WISDOM TOOTH EXTRACTION            Social History:  She reports that she has never smoked. She has been exposed to tobacco smoke. She has never used smokeless tobacco. She reports that she does not currently use alcohol. She reports that she does not use drugs.    Family History:  family history includes Diabetes type II in her mother; No Known Problems in her brother; cardiac disorder in her father.      Allergies:  No Known Allergies    Outpatient Medications:  Current Outpatient Medications   Medication Instructions    aspirin 81 mg, oral, Daily    atorvastatin (LIPITOR) 40 mg, oral, Nightly    metoprolol succinate XL (TOPROL-XL) 25 mg, oral, Daily, Do not crush or chew.       Physical Exam:  GENERAL: alert, cooperative, pleasant, in no acute distress  SKIN: warm, dry, no rash.  NECK: no JVD, no FREIDA  CARDIAC: Regular rate and rhythm with no rubs, murmurs, or gallops  CHEST: Normal respiratory efforts, lungs clear to auscultation bilaterally.  ABDOMEN: soft, nontender, nondistended  EXTREMITIES: no edema  NEURO: Alert and oriented x 3.  Grossly normal.  Moves all 4 extremities.    Vitals:    24 1257   BP: 96/63   BP Location: Right arm   Patient Position: Sitting   BP Cuff Size: Adult   Pulse: (!) 47   SpO2: 98%   Weight: 71.2 kg (157 lb)   Height: 1.676 m (5' 6\")       Wt Readings from Last 5 Encounters:   24 69.9 kg (154 lb)   24 68 kg (149 lb 14.6 oz)   24 70.3 kg (155 lb)   10/24/23 67.8 kg (149 lb 8 oz)   23 66.6 kg (146 lb 12.8 oz)     Body mass index is 25.34 kg/m².        Last Labs:  CMP:No results for input(s): \"NA\", \"K\", \"CL\", \"CO2\", \"ANIONGAP\", \"BUN\", \"CREATININE\", \"EGFR\", \"GLUCOSE\" in the last 37803 hours.No results for input(s): \"ALBUMIN\", \"ALKPHOS\", \"ALT\", \"AST\", \"BILITOT\", \"LIPASE\" in the last 97452 hours.    No lab exists for component: \"CA\"  CBC:No results for input(s): \"WBC\", \"HGB\", \"HCT\", \"PLT\", \"MCV\" in the " "last 15344 hours.  COAG: No results for input(s): \"INR\", \"DDIMERVTE\" in the last 85871 hours.  ENDO:  Recent Labs     03/26/24  0840 05/23/23  0630   HGBA1C 5.3 5.2      CARDIAC:   LIPID PANEL BASIC  3/26/24:  Component  Ref Range & Units 6 mo ago   Cholesterol, Total  <200 mg/dL 169   Comment: <200 mg/dL, Desirable   200-239 mg/dL, Borderline high  >239 mg/dL, High   Triglyceride  <150 mg/dL 81   Comment: <150 mg/dL, Normal   150-199 mg/dL, Borderline high   200-499 mg/dL, High  >499 mg/dL, Very high   HDL Cholesterol  >39 mg/dL 55   Comment:  40-59 mg/dL, Acceptable  >59 mg/dL, High: Negative risk factor for coronary heart disease  <40 mg/dL, Low: Positive risk factor for coronary heart disease   Non HDL Cholesterol  <130 mg/dL 114   Comment: <130 mg/dL, Optimal   130-159 mg/dL, Near optimal/above optimal   160-189 mg/dL, Borderline high   190-219 mg/dL, High  >219 mg/dL, Very high  Secondary prevention optimal non HDL Cholesterol levels are recommended to be <100 mg/dL   Fasting Time  hrs 12   VLDL Cholesterol  <30 mg/dL 16   TC:HDL Ratio  <5.10 3.07   LDL Cholesterol  <100 mg/dL 98           Assessment/Plan   54-year-old female with history of HTN, dyslipidemia, non-STEMI 5/2023 secondary to type II SCAD of OM. Scan looking for FMD is not suggestive at this time and she continues to follow with Dr. Perez.     We will continue to focus on secondary prevention with good BP control, metoprolol, statin and aspirin. She has already started healthier eating habits.  Regular moderate exercise reviewed again.  No heavy lifting.  Lipids at goal.  Her bilateral hand and foot cramping is consistent with arthritis and suggested she follow up with PCP about this. Very low suspicion for statin related association or PAD.  Plan to follow-up in about 6-12 months                 Followup Appts:  Future Appointments   Date Time Provider Department Center   11/12/2024 10:30 AM Merced Perez MD [id number]   3/24/2025  1:40 PM " Tyler Watkins DO [id number]           ____________________________________________________________  DO Chris Overton [facility]

## 2024-09-23 ENCOUNTER — OFFICE VISIT (OUTPATIENT)
Dept: CARDIOLOGY | Facility: CLINIC | Age: 54
End: 2024-09-23
Payer: COMMERCIAL

## 2024-09-23 VITALS
HEIGHT: 66 IN | BODY MASS INDEX: 25.23 KG/M2 | HEART RATE: 47 BPM | SYSTOLIC BLOOD PRESSURE: 96 MMHG | DIASTOLIC BLOOD PRESSURE: 63 MMHG | WEIGHT: 157 LBS | OXYGEN SATURATION: 98 %

## 2024-09-23 DIAGNOSIS — E78.2 MIXED HYPERLIPIDEMIA: ICD-10-CM

## 2024-09-23 DIAGNOSIS — I10 CHRONIC HYPERTENSION: ICD-10-CM

## 2024-09-23 DIAGNOSIS — I25.42 SPONTANEOUS DISSECTION OF CORONARY ARTERY: Primary | ICD-10-CM

## 2024-09-23 PROCEDURE — 99214 OFFICE O/P EST MOD 30 MIN: CPT | Performed by: INTERNAL MEDICINE

## 2024-09-23 PROCEDURE — 3008F BODY MASS INDEX DOCD: CPT | Performed by: INTERNAL MEDICINE

## 2024-09-23 PROCEDURE — 3074F SYST BP LT 130 MM HG: CPT | Performed by: INTERNAL MEDICINE

## 2024-09-23 PROCEDURE — 1036F TOBACCO NON-USER: CPT | Performed by: INTERNAL MEDICINE

## 2024-09-23 PROCEDURE — 3078F DIAST BP <80 MM HG: CPT | Performed by: INTERNAL MEDICINE

## 2024-09-23 ASSESSMENT — COLUMBIA-SUICIDE SEVERITY RATING SCALE - C-SSRS
1. IN THE PAST MONTH, HAVE YOU WISHED YOU WERE DEAD OR WISHED YOU COULD GO TO SLEEP AND NOT WAKE UP?: NO
6. HAVE YOU EVER DONE ANYTHING, STARTED TO DO ANYTHING, OR PREPARED TO DO ANYTHING TO END YOUR LIFE?: NO
2. HAVE YOU ACTUALLY HAD ANY THOUGHTS OF KILLING YOURSELF?: NO

## 2024-09-23 ASSESSMENT — PAIN SCALES - GENERAL: PAINLEVEL: 0-NO PAIN

## 2024-11-12 ENCOUNTER — OFFICE VISIT (OUTPATIENT)
Dept: CARDIOLOGY | Facility: HOSPITAL | Age: 54
End: 2024-11-12
Payer: COMMERCIAL

## 2024-11-12 VITALS
SYSTOLIC BLOOD PRESSURE: 114 MMHG | OXYGEN SATURATION: 99 % | BODY MASS INDEX: 25.28 KG/M2 | WEIGHT: 157.3 LBS | HEIGHT: 66 IN | HEART RATE: 54 BPM | DIASTOLIC BLOOD PRESSURE: 76 MMHG

## 2024-11-12 DIAGNOSIS — I21.4 NSTEMI (NON-ST ELEVATED MYOCARDIAL INFARCTION) (MULTI): ICD-10-CM

## 2024-11-12 DIAGNOSIS — M25.571 PAIN IN JOINTS OF BOTH FEET: ICD-10-CM

## 2024-11-12 DIAGNOSIS — M25.572 PAIN IN JOINTS OF BOTH FEET: ICD-10-CM

## 2024-11-12 DIAGNOSIS — M79.10 MYALGIA: Primary | ICD-10-CM

## 2024-11-12 DIAGNOSIS — I25.42 SPONTANEOUS DISSECTION OF CORONARY ARTERY: ICD-10-CM

## 2024-11-12 LAB
ATRIAL RATE: 54 BPM
P AXIS: 65 DEGREES
P OFFSET: 171 MS
P ONSET: 115 MS
PR INTERVAL: 206 MS
Q ONSET: 218 MS
QRS COUNT: 9 BEATS
QRS DURATION: 80 MS
QT INTERVAL: 420 MS
QTC CALCULATION(BAZETT): 398 MS
QTC FREDERICIA: 405 MS
R AXIS: -21 DEGREES
T AXIS: 67 DEGREES
T OFFSET: 428 MS
VENTRICULAR RATE: 54 BPM

## 2024-11-12 PROCEDURE — 3078F DIAST BP <80 MM HG: CPT | Performed by: INTERNAL MEDICINE

## 2024-11-12 PROCEDURE — 3074F SYST BP LT 130 MM HG: CPT | Performed by: INTERNAL MEDICINE

## 2024-11-12 PROCEDURE — 99214 OFFICE O/P EST MOD 30 MIN: CPT | Performed by: INTERNAL MEDICINE

## 2024-11-12 PROCEDURE — 93005 ELECTROCARDIOGRAM TRACING: CPT | Performed by: INTERNAL MEDICINE

## 2024-11-12 PROCEDURE — 3008F BODY MASS INDEX DOCD: CPT | Performed by: INTERNAL MEDICINE

## 2024-11-12 PROCEDURE — 1036F TOBACCO NON-USER: CPT | Performed by: INTERNAL MEDICINE

## 2024-11-12 NOTE — PATIENT INSTRUCTIONS
Continue meds as prescribed, okay to take them all at night.  Monitor pulse and BP at home occasionally.  Blood work ordered - we will reach out with results.     See you back in August/September for FUV.    Merced Perez MD  Co-Director, Vascular Center  [facility] [place], [facility]   Madi Starkey Master Clinician in Fibromuscular Dysplasia and Vascular Care  Professor of Medicine  [facility]

## 2024-11-12 NOTE — PROGRESS NOTES
24    Vascular Medicine - FMD/Arterial Dissection Program    History Of Present Illness:    Jerri Duque is a 54 y.o. 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  She had previously un Rx HTN and was under a lot of chronic emotional stress. She has no definite underlying FMD on brain to pelvis imaging, slight cerebrovascular ectasia/tortuosity.    Since I saw her in the she spring, no major cardiac or vascular events.    About once a month she feels a tightness in her whole body and her chest that is positional (better lying on certain sides) and may last 1/2 hour. It is different than her MI pain; does not occur with exertion.      She has no exertional dyspnea.  She has no palpitations. Mild dizziness sometimes during the day.    She has pain in her joints in the morning, better after a hot shower.  Mainly involve feet/legs, but also sometimes shoulders and perhaps muscles of arms.    SH: She is  and has an 18-year-old son, non-smoker denies alcohol or illicit drugs. 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      Past Medical History:  She has a past medical history of Coronary artery dissection, Heart attack, Hypercholesterolemia, and Hypertension.    Past Surgical History:  She has a past surgical history that includes  section, low transverse; [place] tooth extraction; and Bunionectomy.      Social History:  She reports that she has never smoked. She has been exposed to tobacco smoke. She has never used smokeless tobacco. She reports that she does not currently use alcohol. She reports that she does not use drugs.    Family History:  Family History   Problem Relation Name Age of Onset    Diabetes type II Mother      Other (cardiac disorder) Father      No Known Problems Brother     FH: Father with CABG x4 at age 70s, mother with well-controlled diabetes and hypertension no dissection, aneurysm or connective tissue disease. Her 40 years old brother is  "healthy.      Allergies:  Patient has no known allergies.    Outpatient Medications:  Current Outpatient Medications   Medication Instructions    aspirin 81 mg, Daily    atorvastatin (LIPITOR) 40 mg, oral, Nightly    metoprolol succinate XL (TOPROL-XL) 25 mg, oral, Daily, Do not crush or chew.       Physical Exam:  Patient Vitals for the past 24 hrs:   BP Pulse SpO2 Height Weight   11/12/24 1029 114/76 -- -- -- --   11/12/24 1024 121/67 54 99 % 1.676 m (5' 6\") 71.4 kg (157 lb 4.8 oz)   HEENT benign  JVP flat, no cervical bruits  +S1, S2, RRR; no m/r/g  Clear lungs  She is alert, oriented, fluid speech  Appropriate affect  Cool feet, but normal, multiphasic Doppler signals at ankle    ECG today sinus bradycardia at 54 bets per minute  Normal intervals/axis  No ischemic changes    8.2024 labs reviewed  HgB 14.5,  K, Wbc 6.12K  ALT 32/AST 36  K 4.0, Cr 0.78    3.2024   Tchol 169, HDL 55, Trig 81, LDL 98 mg/dL    10.2023 head to pelvis CTA 6. 2023  No aortic or visceral branch aneurysms  No carotid/vert dissections/FMD  No IC aneurysms  I don't see FMD in the cerebrovascular, iliac visceral, or any arteries.   Slight tortuosity right vert V3 segment, ? slight ectasia -- repeat scan 3 years.    10.2023  Transthoracic Echo (TTE)   CONCLUSIONS:   1. Left ventricular systolic function is normal with a 60-65% estimated ejection fraction. No RWMA reported   2. RVSP within normal limits.    Assessment/Plan     54 y.o. woman with OM SCAD, NSTEMI in 5.2023 in setting of uncontrolled HTN. She is now at the 1 1/2 year alisha post event and has recovered well. She has completed cardiac rehab. She has atypical chest pain, seems MSK in nature.  Her LVEF is normal no evidence of heart failure.    At this time, I suggested she try taking metoprolol at night to see if dizziness a little better; could potentially drop this to 12.5 mg QD if she has dizziness/sinus bradycardia, but sx not classical of hemodynamic dizziness. She will " continue aspirin, statin Rx. BP well controlled.    She has myalgias, arthralgias; I will check CPK on statin, IVON, RF, ESR; some sx seem worse with menopause and stopping HRT. No evidence that this is vasculogenic.    She has minor arterial tortuosity, vertebral ectasia.  no underlying FMD and no occult aneurysms/dissections; will plan repeat head to pelvis imaging at 3 year alisha from prior scans (late 2026 or 2026). Overall, I think yield of conventional genetic testing for arteriopathy/aortopathy genes is low for her SCAD event and would not refer.    RTC summer/fall 2024 to try to have on an alternating 6 month cycle with Dr. Watkins and me.  I will follow-up with her on the myalgia/arthralgia labs.      Merced Perez MD  Co-Director, Vascular Center  [facility] [place], [facility]   Madi Starkey Master Clinician in Fibromuscular Dysplasia and Vascular Care  Professor of Medicine  [facility]

## 2024-11-12 NOTE — LETTER
2024     Gigi Dumotn MD  [address] [facility]  [address]  [facility] [address]    Patient: Jerri Duque   YOB: 1970   Date of Visit: 2024       Dear Dr. Gigi Dumont MD:    Thank you for referring Jerri Duque to me for evaluation. Below are my notes for this consultation.  If you have questions, please do not hesitate to call me. I look forward to following your patient along with you.       Sincerely,     Merced Perez MD      CC: No Recipients  ______________________________________________________________________________________    24    Vascular Medicine - FMD/Arterial Dissection Program    History Of Present Illness:    Jerri Duque is a 54 y.o. 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  She had previously un Rx HTN and was under a lot of chronic emotional stress. She has no definite underlying FMD on brain to pelvis imaging, slight cerebrovascular ectasia/tortuosity.    Since I saw her in the she spring, no major cardiac or vascular events.    About once a month she feels a tightness in her whole body and her chest that is positional (better lying on certain sides) and may last 1/2 hour. It is different than her MI pain; does not occur with exertion.      She has no exertional dyspnea.  She has no palpitations. Mild dizziness sometimes during the day.    She has pain in her joints in the morning, better after a hot shower.  Mainly involve feet/legs, but also sometimes shoulders and perhaps muscles of arms.    SH: She is  and has an 18-year-old son, non-smoker denies alcohol or illicit drugs. 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      Past Medical History:  She has a past medical history of Coronary artery dissection, Heart attack, Hypercholesterolemia, and Hypertension.    Past Surgical History:  She has a past surgical history that includes  section, low transverse; [place] tooth  "extraction; and Bunionectomy.      Social History:  She reports that she has never smoked. She has been exposed to tobacco smoke. She has never used smokeless tobacco. She reports that she does not currently use alcohol. She reports that she does not use drugs.    Family History:  Family History   Problem Relation Name Age of Onset   • Diabetes type II Mother     • Other (cardiac disorder) Father     • No Known Problems Brother     FH: Father with CABG x4 at age 70s, mother with well-controlled diabetes and hypertension no dissection, aneurysm or connective tissue disease. Her 40 years old brother is healthy.      Allergies:  Patient has no known allergies.    Outpatient Medications:  Current Outpatient Medications   Medication Instructions   • aspirin 81 mg, Daily   • atorvastatin (LIPITOR) 40 mg, oral, Nightly   • metoprolol succinate XL (TOPROL-XL) 25 mg, oral, Daily, Do not crush or chew.       Physical Exam:  Patient Vitals for the past 24 hrs:   BP Pulse SpO2 Height Weight   11/12/24 1029 114/76 -- -- -- --   11/12/24 1024 121/67 54 99 % 1.676 m (5' 6\") 71.4 kg (157 lb 4.8 oz)   HEENT benign  JVP flat, no cervical bruits  +S1, S2, RRR; no m/r/g  Clear lungs  She is alert, oriented, fluid speech  Appropriate affect  Cool feet, but normal, multiphasic Doppler signals at ankle    ECG today sinus bradycardia at 54 bets per minute  Normal intervals/axis  No ischemic changes    8.2024 labs reviewed  HgB 14.5,  K, Wbc 6.12K  ALT 32/AST 36  K 4.0, Cr 0.78    3.2024   Tchol 169, HDL 55, Trig 81, LDL 98 mg/dL    10.2023 head to pelvis CTA 6. 2023  No aortic or visceral branch aneurysms  No carotid/vert dissections/FMD  No IC aneurysms  I don't see FMD in the cerebrovascular, iliac visceral, or any arteries.   Slight tortuosity right vert V3 segment, ? slight ectasia -- repeat scan 3 years.    10.2023  Transthoracic Echo (TTE)   CONCLUSIONS:   1. Left ventricular systolic function is normal with a 60-65% estimated " ejection fraction. No RWMA reported   2. RVSP within normal limits.    Assessment/Plan    54 y.o. woman with OM SCAD, NSTEMI in 5.2023 in setting of uncontrolled HTN. She is now at the 1 1/2 year alisha post event and has recovered well. She has completed cardiac rehab. She has atypical chest pain, seems MSK in nature.  Her LVEF is normal no evidence of heart failure.    At this time, I suggested she try taking metoprolol at night to see if dizziness a little better; could potentially drop this to 12.5 mg QD if she has dizziness/sinus bradycardia, but sx not classical of hemodynamic dizziness. She will continue aspirin, statin Rx. BP well controlled.    She has myalgias, arthralgias; I will check CPK on statin, IVON, RF, ESR; some sx seem worse with menopause and stopping HRT. No evidence that this is vasculogenic.    She has minor arterial tortuosity, vertebral ectasia.  no underlying FMD and no occult aneurysms/dissections; will plan repeat head to pelvis imaging at 3 year alisha from prior scans (late 2026 or 2026). Overall, I think yield of conventional genetic testing for arteriopathy/aortopathy genes is low for her SCAD event and would not refer.    RTC summer/fall 2024 to try to have on an alternating 6 month cycle with Dr. Watkins and me.  I will follow-up with her on the myalgia/arthralgia labs.      Merced Perez MD  Co-Director, Vascular Center  [facility], [facility]   Madi Starkey Master Clinician in Fibromuscular Dysplasia and Vascular Care  Professor of Medicine  [facility]

## 2024-11-14 ENCOUNTER — LAB (OUTPATIENT)
Dept: LAB | Facility: LAB | Age: 54
End: 2024-11-14
Payer: COMMERCIAL

## 2024-11-14 DIAGNOSIS — M25.572 PAIN IN JOINTS OF BOTH FEET: ICD-10-CM

## 2024-11-14 DIAGNOSIS — M25.571 PAIN IN JOINTS OF BOTH FEET: ICD-10-CM

## 2024-11-14 LAB
CK SERPL-CCNC: 55 U/L (ref 0–215)
ERYTHROCYTE [SEDIMENTATION RATE] IN BLOOD BY WESTERGREN METHOD: 3 MM/H (ref 0–30)
RHEUMATOID FACT SER NEPH-ACNC: <10 IU/ML (ref 0–15)

## 2024-11-14 PROCEDURE — 86431 RHEUMATOID FACTOR QUANT: CPT

## 2024-11-14 PROCEDURE — 82550 ASSAY OF CK (CPK): CPT

## 2024-11-14 PROCEDURE — 86038 ANTINUCLEAR ANTIBODIES: CPT

## 2024-11-14 PROCEDURE — 85652 RBC SED RATE AUTOMATED: CPT

## 2024-11-14 PROCEDURE — 36415 COLL VENOUS BLD VENIPUNCTURE: CPT

## 2024-11-15 LAB — ANA SER QL HEP2 SUBST: NEGATIVE

## 2025-03-12 ENCOUNTER — TELEPHONE (OUTPATIENT)
Dept: CARDIOLOGY | Facility: CLINIC | Age: 55
End: 2025-03-12
Payer: COMMERCIAL

## 2025-03-12 NOTE — TELEPHONE ENCOUNTER
Reviewed chart and no recent labs initially seen. Updated Care Everywhere and [facility]  labs now available. TCT patient and left VMM regarding same

## 2025-03-12 NOTE — PROGRESS NOTES
Primary Care Physician: Gigi Dumont MD  Date of Visit: 03/17/2025  2:40 PM EDT  Location of visit: [address]     Chief Complaint:   Follow up visit     HPI / Summary:   54-year-old female with HTN, dyslipidemia, possible asthma, NSTEMI 5/2023 with cath suggestive of SCAD of small OM vessel.     She presented to [facility] 5/22/2023 with nonexertional chest pain/pressure that radiated to the left shoulder that day. She was at work sitting when it occurred. Works at [employer]. Presented to [facility] and had increased troponin c/w NSTEMI. Echocardiogram showed EF 53% with basal inferior wall motion abnormality. She was sent for left heart catheterization which showed severe narrowing of a small OM 2 that was not amenable to PCI otherwise trivial CAD.     Saw Dr. Perez. CTA without evidence of FMD. Some vertebral artery tortuosity with plans to repeat CT in 2-3 yrs. She was switched to Toprol XL from carvedilol.     interval events:  6 month follow up  Bothered by 15 lb weight gain in past year since being in menopause. Trying to eat less. Wants to get back to 140 lbs. Realizes with menopause this might not happen.  No chest pain or dyspnea  She biked 10 miles daily in the heat in [facility] this summer without issue.  Continues to have arthritic pain in feet in the morning that improved      LHC 5/23/2023 ([facility]): Left main normal, LCx normal with severe narrowing of sidebranch OM 2 that is small, LAD normal with myocardial bridge 20%, RCA dominant and normal      ECHO 10/2023: LVEF 60-65%, RVSP normal  ECHO 5/23: LVEF 53%, grade 1 diastolic dysfunction, normal GLS 15.8%    EKG 11/12/24  Sinus bradycardia with sinus arrhythmia  Leftward axis        Family history of father with MI  Non-smoker, nondrinker, no illicits         Past Medical History:  Past Medical History:   Diagnosis Date    Coronary artery dissection     Heart attack     Hypercholesterolemia     Hypertension         Past  "Surgical History:  Past Surgical History:   Procedure Laterality Date    BUNIONECTOMY       SECTION, LOW TRANSVERSE      WISDOM TOOTH EXTRACTION            Social History:  She reports that she has never smoked. She has been exposed to tobacco smoke. She has never used smokeless tobacco. She reports that she does not currently use alcohol. She reports that she does not use drugs.    Family History:  family history includes Diabetes type II in her mother; No Known Problems in her brother; cardiac disorder in her father.      Allergies:  No Known Allergies    Outpatient Medications:  Current Outpatient Medications   Medication Instructions    aspirin 81 mg, Daily    atorvastatin (LIPITOR) 40 mg, oral, Nightly    metoprolol succinate XL (TOPROL-XL) 25 mg, oral, Daily, Do not crush or chew.       Physical Exam:  GENERAL: alert, cooperative, pleasant, in no acute distress  SKIN: warm, dry, no rash.  NECK: no JVD, no FREIDA  CARDIAC: Regular rate and rhythm with no rubs, murmurs, or gallops  CHEST: Normal respiratory efforts, lungs clear to auscultation bilaterally.  ABDOMEN: soft, nontender, nondistended  EXTREMITIES: no edema  NEURO: Alert and oriented x 3.  Grossly normal.  Moves all 4 extremities.    There were no vitals filed for this visit.      Wt Readings from Last 5 Encounters:   24 71.4 kg (157 lb 4.8 oz)   24 71.2 kg (157 lb)   24 69.9 kg (154 lb)   24 68 kg (149 lb 14.6 oz)   24 70.3 kg (155 lb)     There is no height or weight on file to calculate BMI.        Last Labs:  CMP:No results for input(s): \"NA\", \"K\", \"CL\", \"CO2\", \"ANIONGAP\", \"BUN\", \"CREATININE\", \"EGFR\", \"GLUCOSE\" in the last 73508 hours.No results for input(s): \"ALBUMIN\", \"ALKPHOS\", \"ALT\", \"AST\", \"BILITOT\", \"LIPASE\" in the last 94782 hours.    No lab exists for component: \"CA\"  CBC:No results for input(s): \"WBC\", \"HGB\", \"HCT\", \"PLT\", \"MCV\" in the last 24892 hours.  COAG: No results for input(s): \"INR\", \"DDIMERVTE\" " in the last 97491 hours.  ENDO:  Recent Labs     03/26/24  0840 05/23/23  0630   HGBA1C 5.3 5.2      CARDIAC:   LIPID PANEL BASIC  3/26/24:  Component  Ref Range & Units 6 mo ago   Cholesterol, Total  <200 mg/dL 169   Comment: <200 mg/dL, Desirable   200-239 mg/dL, Borderline high  >239 mg/dL, High   Triglyceride  <150 mg/dL 81   Comment: <150 mg/dL, Normal   150-199 mg/dL, Borderline high   200-499 mg/dL, High  >499 mg/dL, Very high   HDL Cholesterol  >39 mg/dL 55   Comment:  40-59 mg/dL, Acceptable  >59 mg/dL, High: Negative risk factor for coronary heart disease  <40 mg/dL, Low: Positive risk factor for coronary heart disease   Non HDL Cholesterol  <130 mg/dL 114   Comment: <130 mg/dL, Optimal   130-159 mg/dL, Near optimal/above optimal   160-189 mg/dL, Borderline high   190-219 mg/dL, High  >219 mg/dL, Very high  Secondary prevention optimal non HDL Cholesterol levels are recommended to be <100 mg/dL   Fasting Time  hrs 12   VLDL Cholesterol  <30 mg/dL 16   TC:HDL Ratio  <5.10 3.07   LDL Cholesterol  <100 mg/dL 98           Assessment/Plan   54-year-old female with history of HTN, dyslipidemia, non-STEMI 5/2023 secondary to type II SCAD of OM. Scan looking for FMD is not suggestive at this time and she continues to follow with Dr. Perez.     We will continue to focus on secondary prevention with good BP control, metoprolol, statin and aspirin. She has already started healthier eating habits.  Regular moderate exercise reviewed again.  No heavy lifting.  Lipids at goal.  Her bilateral hand and foot cramping is consistent with arthritis and suggested she follow up with PCP about this. Very low suspicion for statin related association or PAD.  Plan to follow-up in about 6-12 months                 Followup Appts:  Future Appointments   Date Time Provider Department Center   3/17/2025  2:40 PM Tyler Watkins DO [id number] [facility]   9/9/2025 10:00 AM Merced Perez MD [id number] [facility]            ____________________________________________________________  Tyler Watkins DO  [facility]

## 2025-03-17 ENCOUNTER — APPOINTMENT (OUTPATIENT)
Dept: CARDIOLOGY | Facility: CLINIC | Age: 55
End: 2025-03-17
Payer: COMMERCIAL

## 2025-03-24 ENCOUNTER — APPOINTMENT (OUTPATIENT)
Dept: CARDIOLOGY | Facility: CLINIC | Age: 55
End: 2025-03-24
Payer: COMMERCIAL

## 2025-04-10 NOTE — PROGRESS NOTES
Primary Care Physician: Gigi Dumont MD  Date of Visit: 2025  2:20 PM EDT  Location of visit: [address]     Chief Complaint:   Follow up visit     HPI / Summary:   54-year-old female with HTN, dyslipidemia, possible asthma, NSTEMI 2023 with cath suggestive of SCAD of small OM vessel.     She presented to [facility] 2023 with nonexertional chest pain/pressure that radiated to the left shoulder that day. She was at work sitting when it occurred. Works at [employer]. Presented to [facility] and had increased troponin c/w NSTEMI. Echocardiogram showed EF 53% with basal inferior wall motion abnormality. She was sent for left heart catheterization which showed severe narrowing of a small OM 2 that was not amenable to PCI otherwise trivial CAD.     Saw Dr. Perez. CTA without evidence of FMD. Some vertebral artery tortuosity with plans to monitor with serial CT     interval events:  6 month follow up  Biggest concern is plantar fasciitis  No chest pain or dyspnea  Has tried off statin before for 1-2 months and didn't notice any change in arthritic like pain.          CARDIAC TESTING:    EKG 24: Sinus bradycardia with sinus arrhythmia      C 2023 ([facility]): Left main normal, LCx normal with severe narrowing of sidebranch OM 2 that is small, LAD normal with myocardial bridge 20%, RCA dominant and normal      ECHO 10/2023: LVEF 60-65%, RVSP normal  ECHO : LVEF 53%, grade 1 diastolic dysfunction, normal GLS 15.8%         Family history of father with MI  Non-smoker, nondrinker, no illicits         Past Medical History:  Past Medical History:   Diagnosis Date    Coronary artery dissection     Heart attack     Hypercholesterolemia     Hypertension         Past Surgical History:  Past Surgical History:   Procedure Laterality Date    BUNIONECTOMY       SECTION, LOW TRANSVERSE      WISDOM TOOTH EXTRACTION            Social History:  She reports that she has never smoked. She  "has been exposed to tobacco smoke. She has never used smokeless tobacco. She reports that she does not currently use alcohol. She reports that she does not use drugs.    Family History:  family history includes Diabetes type II in her mother; No Known Problems in her brother; cardiac disorder in her father.      Allergies:  No Known Allergies    Outpatient Medications:  Current Outpatient Medications   Medication Instructions    aspirin 81 mg, Daily    atorvastatin (LIPITOR) 40 mg, oral, Nightly    metoprolol succinate XL (TOPROL-XL) 25 mg, oral, Daily, Do not crush or chew.       Physical Exam:  GENERAL: alert, cooperative, pleasant, in no acute distress  SKIN: warm, dry, no rash.  NECK: no JVD, no FREIDA  CARDIAC: Regular rate and rhythm with no rubs, murmurs, or gallops  CHEST: Normal respiratory efforts, lungs clear to auscultation bilaterally.  EXTREMITIES: no edema  NEURO: Alert and oriented x 3.  Grossly normal.  Moves all 4 extremities.    Vitals:    04/14/25 1406   BP: 117/73   BP Location: Left arm   Pulse: 65   SpO2: 98%   Weight: 72.6 kg (160 lb)         Wt Readings from Last 5 Encounters:   11/12/24 71.4 kg (157 lb 4.8 oz)   09/23/24 71.2 kg (157 lb)   07/19/24 69.9 kg (154 lb)   04/21/24 68 kg (149 lb 14.6 oz)   03/18/24 70.3 kg (155 lb)     Body mass index is 25.82 kg/m².        Last Labs:  CMP:No results for input(s): \"NA\", \"K\", \"CL\", \"CO2\", \"ANIONGAP\", \"BUN\", \"CREATININE\", \"EGFR\", \"GLUCOSE\" in the last 33661 hours.No results for input(s): \"ALBUMIN\", \"ALKPHOS\", \"ALT\", \"AST\", \"BILITOT\", \"LIPASE\" in the last 60298 hours.    No lab exists for component: \"CA\"  CBC:No results for input(s): \"WBC\", \"HGB\", \"HCT\", \"PLT\", \"MCV\" in the last 20417 hours.  COAG: No results for input(s): \"INR\", \"DDIMERVTE\" in the last 93865 hours.  ENDO:  Recent Labs     03/26/24  0840 05/23/23  0630   HGBA1C 5.3 5.2      CARDIAC:   1/3/2025  LIPID PANEL BASIC  Order: [id number]  Component  Ref Range & Units 3 mo ago   Cholesterol, " Total  <200 mg/dL 166   Comment: <200 mg/dL, Desirable   200-239 mg/dL, Borderline high  >239 mg/dL, High   Triglyceride  <150 mg/dL 97   Comment: <150 mg/dL, Normal   150-199 mg/dL, Borderline high   200-499 mg/dL, High  >499 mg/dL, Very high   HDL Cholesterol  >39 mg/dL 53   Comment:  40-59 mg/dL, Acceptable  >59 mg/dL, High: Negative risk factor for coronary heart disease  <40 mg/dL, Low: Positive risk factor for coronary heart disease   Non HDL Cholesterol  <130 mg/dL 113   Comment: <130 mg/dL, Optimal   130-159 mg/dL, Near optimal/above optimal   160-189 mg/dL, Borderline high   190-219 mg/dL, High  >219 mg/dL, Very high  Secondary prevention optimal non HDL Cholesterol levels are recommended to be <100 mg/dL   Fasting Time  hrs 12   VLDL Cholesterol  <30 mg/dL 19   TC:HDL Ratio  <5.10 3.13   LDL Cholesterol  <100 mg/dL 94   Comment: <100 mg/dL, Optimal   100-129 mg/dL, Near optimal/above optimal   130-159 mg/dL, Borderline high   160-189 mg/dL, High  >189 mg/dL, Very high  Secondary prevention optimal LDL Cholesterol levels are recommended to be < 70 mg/dL             Assessment/Plan   54-year-old female with history of HTN, dyslipidemia, non-STEMI 5/2023 secondary to type II SCAD of OM. Scan looking for FMD is not suggestive at this time and she continues to follow with Dr. Perez.     We will continue to focus on secondary prevention with good BP control, metoprolol, statin and aspirin. She has already started healthier eating habits.  Regular moderate exercise reviewed again.  No heavy lifting.  Lipids at goal.  Intermittent bilateral arthritis and most recent issue has been plantar fasciitis.  Has tried off statins previously for 1 to 2 months without any change so it is not statin related.  She is scheduled to see Dr. Perez in September.  I will plan to follow-up in about 1 year.             Followup Appts:  Future Appointments   Date Time Provider Department Center   9/9/2025 10:00 AM Merced Perez  MD [id number] [facility]   4/13/2026  2:20 PM Tyler Watkins DO [id number] [facility]           ____________________________________________________________  Tyler Watkins DO  [facility]

## 2025-04-14 ENCOUNTER — OFFICE VISIT (OUTPATIENT)
Dept: CARDIOLOGY | Facility: CLINIC | Age: 55
End: 2025-04-14
Payer: COMMERCIAL

## 2025-04-14 VITALS
BODY MASS INDEX: 25.82 KG/M2 | OXYGEN SATURATION: 98 % | WEIGHT: 160 LBS | DIASTOLIC BLOOD PRESSURE: 73 MMHG | HEART RATE: 65 BPM | SYSTOLIC BLOOD PRESSURE: 117 MMHG

## 2025-04-14 DIAGNOSIS — M25.571 PAIN IN JOINTS OF BOTH FEET: ICD-10-CM

## 2025-04-14 DIAGNOSIS — M25.572 PAIN IN JOINTS OF BOTH FEET: ICD-10-CM

## 2025-04-14 DIAGNOSIS — I25.42 SPONTANEOUS DISSECTION OF CORONARY ARTERY: Primary | ICD-10-CM

## 2025-04-14 DIAGNOSIS — E78.2 MIXED HYPERLIPIDEMIA: ICD-10-CM

## 2025-04-14 PROCEDURE — 3074F SYST BP LT 130 MM HG: CPT | Performed by: INTERNAL MEDICINE

## 2025-04-14 PROCEDURE — 99214 OFFICE O/P EST MOD 30 MIN: CPT | Performed by: INTERNAL MEDICINE

## 2025-04-14 PROCEDURE — 3078F DIAST BP <80 MM HG: CPT | Performed by: INTERNAL MEDICINE

## 2025-04-14 PROCEDURE — G2211 COMPLEX E/M VISIT ADD ON: HCPCS | Performed by: INTERNAL MEDICINE

## 2025-04-14 RX ORDER — ACETAMINOPHEN 500 MG
2000 TABLET ORAL
COMMUNITY
Start: 2025-01-30 | End: 2025-04-30

## 2025-07-21 ENCOUNTER — TELEPHONE (OUTPATIENT)
Dept: CARDIOLOGY | Facility: CLINIC | Age: 55
End: 2025-07-21
Payer: COMMERCIAL

## 2025-07-21 NOTE — TELEPHONE ENCOUNTER
Patient had questions regarding OTC supplements for muscle/joint aches and also regarding Ozempic. All questions answered. Patient states understanding and states she may try CoQ10. Instructed to call back if she wants to pursue GLP2.

## 2025-08-29 ENCOUNTER — ANCILLARY PROCEDURE (OUTPATIENT)
Dept: URGENT CARE | Age: 55
End: 2025-08-29
Payer: COMMERCIAL

## 2025-08-29 ENCOUNTER — OFFICE VISIT (OUTPATIENT)
Dept: URGENT CARE | Age: 55
End: 2025-08-29
Payer: COMMERCIAL

## 2025-08-29 VITALS
WEIGHT: 158 LBS | DIASTOLIC BLOOD PRESSURE: 65 MMHG | HEART RATE: 69 BPM | OXYGEN SATURATION: 100 % | HEIGHT: 66 IN | BODY MASS INDEX: 25.39 KG/M2 | TEMPERATURE: 98.4 F | SYSTOLIC BLOOD PRESSURE: 111 MMHG | RESPIRATION RATE: 18 BRPM

## 2025-08-29 DIAGNOSIS — S39.012A STRAIN OF LUMBAR REGION, INITIAL ENCOUNTER: Primary | ICD-10-CM

## 2025-08-29 DIAGNOSIS — K59.03 DRUG-INDUCED CONSTIPATION: ICD-10-CM

## 2025-08-29 LAB
POC APPEARANCE, URINE: CLEAR
POC BILIRUBIN, URINE: NEGATIVE
POC BLOOD, URINE: NEGATIVE
POC COLOR, URINE: NORMAL
POC GLUCOSE, URINE: NEGATIVE MG/DL
POC KETONES, URINE: NEGATIVE MG/DL
POC LEUKOCYTES, URINE: NEGATIVE
POC NITRITE,URINE: NEGATIVE
POC PH, URINE: 6 PH
POC PROTEIN, URINE: NEGATIVE MG/DL
POC SPECIFIC GRAVITY, URINE: 1.01
POC UROBILINOGEN, URINE: 0.2 EU/DL

## 2025-08-29 PROCEDURE — 3074F SYST BP LT 130 MM HG: CPT | Performed by: NURSE PRACTITIONER

## 2025-08-29 PROCEDURE — 99214 OFFICE O/P EST MOD 30 MIN: CPT | Performed by: NURSE PRACTITIONER

## 2025-08-29 PROCEDURE — 3008F BODY MASS INDEX DOCD: CPT | Performed by: NURSE PRACTITIONER

## 2025-08-29 PROCEDURE — 81003 URINALYSIS AUTO W/O SCOPE: CPT | Performed by: NURSE PRACTITIONER

## 2025-08-29 PROCEDURE — 3078F DIAST BP <80 MM HG: CPT | Performed by: NURSE PRACTITIONER

## 2025-08-29 PROCEDURE — 74018 RADEX ABDOMEN 1 VIEW: CPT | Performed by: NURSE PRACTITIONER

## 2025-08-29 PROCEDURE — 1036F TOBACCO NON-USER: CPT | Performed by: NURSE PRACTITIONER

## 2025-08-29 RX ORDER — NAPROXEN 500 MG/1
500 TABLET ORAL EVERY 12 HOURS PRN
COMMUNITY
Start: 2025-08-26

## 2025-08-29 RX ORDER — OXYCODONE HYDROCHLORIDE 5 MG/1
TABLET ORAL
COMMUNITY
Start: 2025-08-21

## 2025-08-29 RX ORDER — CYCLOBENZAPRINE HCL 10 MG
10 TABLET ORAL EVERY 8 HOURS PRN
COMMUNITY
Start: 2025-08-26

## 2025-08-29 ASSESSMENT — ENCOUNTER SYMPTOMS
VOMITING: 0
WEAKNESS: 0
SHORTNESS OF BREATH: 0
COUGH: 0
DIFFICULTY URINATING: 0
CHILLS: 0
DIARRHEA: 0
CONSTIPATION: 1
DIZZINESS: 0
NAUSEA: 0
HEADACHES: 0
FEVER: 0
ACTIVITY CHANGE: 1
ABDOMINAL PAIN: 0
BACK PAIN: 1